# Patient Record
Sex: FEMALE | Race: AMERICAN INDIAN OR ALASKA NATIVE
[De-identification: names, ages, dates, MRNs, and addresses within clinical notes are randomized per-mention and may not be internally consistent; named-entity substitution may affect disease eponyms.]

---

## 2021-04-22 ENCOUNTER — HOSPITAL ENCOUNTER (EMERGENCY)
Dept: HOSPITAL 5 - ED | Age: 26
LOS: 1 days | Discharge: HOME | End: 2021-04-23
Payer: SELF-PAY

## 2021-04-22 VITALS — SYSTOLIC BLOOD PRESSURE: 118 MMHG | DIASTOLIC BLOOD PRESSURE: 73 MMHG

## 2021-04-22 DIAGNOSIS — O03.9: Primary | ICD-10-CM

## 2021-04-22 DIAGNOSIS — Z3A.01: ICD-10-CM

## 2021-04-22 DIAGNOSIS — Z98.890: ICD-10-CM

## 2021-04-22 DIAGNOSIS — Z79.899: ICD-10-CM

## 2021-04-22 LAB
ALBUMIN SERPL-MCNC: 4.2 G/DL (ref 3.9–5)
ALT SERPL-CCNC: 9 UNITS/L (ref 7–56)
APTT BLD: 24.1 SEC. (ref 24.2–36.6)
BAND NEUTROPHILS # (MANUAL): 0 K/MM3
BUN SERPL-MCNC: 6 MG/DL (ref 7–17)
BUN/CREAT SERPL: 12 %
CALCIUM SERPL-MCNC: 8.9 MG/DL (ref 8.4–10.2)
HCT VFR BLD CALC: 31.4 % (ref 30.3–42.9)
HEMOLYSIS INDEX: 2
HGB BLD-MCNC: 10.5 GM/DL (ref 10.1–14.3)
INR PPP: 1.04 (ref 0.87–1.13)
MCHC RBC AUTO-ENTMCNC: 33 % (ref 30–34)
MCV RBC AUTO: 81 FL (ref 79–97)
MYELOCYTES # (MANUAL): 0 K/MM3
PLATELET # BLD: 284 K/MM3 (ref 140–440)
PROMYELOCYTES # (MANUAL): 45.8 K/MM3
RBC # BLD AUTO: 3.86 M/MM3 (ref 3.65–5.03)
TOTAL CELLS COUNTED BLD: 100

## 2021-04-22 PROCEDURE — 83735 ASSAY OF MAGNESIUM: CPT

## 2021-04-22 PROCEDURE — 85610 PROTHROMBIN TIME: CPT

## 2021-04-22 PROCEDURE — 36415 COLL VENOUS BLD VENIPUNCTURE: CPT

## 2021-04-22 PROCEDURE — 96374 THER/PROPH/DIAG INJ IV PUSH: CPT

## 2021-04-22 PROCEDURE — 84702 CHORIONIC GONADOTROPIN TEST: CPT

## 2021-04-22 PROCEDURE — 99284 EMERGENCY DEPT VISIT MOD MDM: CPT

## 2021-04-22 PROCEDURE — 85007 BL SMEAR W/DIFF WBC COUNT: CPT

## 2021-04-22 PROCEDURE — 86901 BLOOD TYPING SEROLOGIC RH(D): CPT

## 2021-04-22 PROCEDURE — 80053 COMPREHEN METABOLIC PANEL: CPT

## 2021-04-22 PROCEDURE — 85730 THROMBOPLASTIN TIME PARTIAL: CPT

## 2021-04-22 PROCEDURE — 96375 TX/PRO/DX INJ NEW DRUG ADDON: CPT

## 2021-04-22 PROCEDURE — 76801 OB US < 14 WKS SINGLE FETUS: CPT

## 2021-04-22 PROCEDURE — 85025 COMPLETE CBC W/AUTO DIFF WBC: CPT

## 2021-04-22 PROCEDURE — 86900 BLOOD TYPING SEROLOGIC ABO: CPT

## 2021-04-22 PROCEDURE — 96361 HYDRATE IV INFUSION ADD-ON: CPT

## 2021-04-22 NOTE — ULTRASOUND REPORT
ULTRASOUND OBSTETRIC 



INDICATION / CLINICAL INFORMATION:

abdominal pain/vaginal bleeding; 12 wks preg.

Clinical Gestational Age (GA): Reported at 12 weeks



TECHNIQUE:

Transabdominal.



COMPARISON:

None available.



FINDINGS:

GESTATIONAL SAC: Well-defined oval shape and intrauterine in location. Measures 3.5 cm corresponding 
to estimated gestation of 8 weeks and 5 days.

YOLK SAC: Not visualized.



EMBRYO/FETUS: Not visualized. 



- Fetal Heart Rate, beats per minute (if present) = not present.



ADNEXA: No significant abnormality.

FREE FLUID: None.



ADDITIONAL FINDINGS: None.



IMPRESSION:

1. Findings suspicious for failed early pregnancy. Consider continued follow-up with serial beta hCG 
and short-term further evaluation with ultrasound in 7-14 days, as warranted.



Signer Name: Tayo Holguin MD 

Signed: 4/22/2021 9:45 PM

Workstation Name: [id number]

## 2021-04-22 NOTE — EMERGENCY DEPARTMENT REPORT
ED General Adult HPI





- General


Chief complaint: Vaginal Bleeding


Stated complaint: ABDOMINAL PAIN/VAG BLEEDING


Time Seen by Provider: 21 20:44


Source: patient, EMS


Mode of arrival: Stretcher


Limitations: No Limitations





- History of Present Illness


Initial comments: 


26-year-old pregnant female patient with history of multiple spontaneous 

abortions secondary to cervical incompetence presents to the emergency 

department with complaints of abdominal pain and vaginal bleeding starting 

today.  Patient has not received any prenatal care during this pregnancy.  She 

estimates she is approximately 12 weeks pregnant. First day of LMP was 

2021.  Patient has visualized multiple blood clots today.  Denies fever, 

chills, nausea, vomiting, syncope.  Denies all other complaints at this time.








- Related Data


                                  Previous Rx's











 Medication  Instructions  Recorded  Last Taken  Type


 


Ondansetron [Zofran Odt] 4 mg PO Q8HR #30 tab.rapdis 21 Unknown Rx


 


oxyCODONE /ACETAMINOPHEN [Percocet 1 tab PO Q6HR PRN #15 tablet 21 Unknown

 Rx





5/325]    











                                    Allergies











Allergy/AdvReac Type Severity Reaction Status Date / Time


 


No Known Allergies Allergy   Unverified 21 20:39














ED Review of Systems


ROS: 


Stated complaint: ABDOMINAL PAIN/VAG BLEEDING


Other details as noted in HPI





Other: 





GENERAL: Negative for fever, chills, weight change, anorexia, fatigue.


ENT: Negative for ear pain, difficulty hearing, sore throat, nasal congestion, 

epistaxis.


CARDIOVASCULAR: Negative for chest pain, palpitations, lower extremity swelling.




PULMONARY: Negative for cough, dyspnea, wheezing, orthopnea, cyanosis. 


GASTROINTESTINAL: Positive for abdominal pain


GENITOURINARY: Positive for vaginal bleeding


MUSCULOSKELETAL: Negative for joint pain, joint swelling, myalgias, back pain, 

neck pain. 


NEUROLOGICAL: Negative for headache, seizure, syncope, paresthesias, weakness. 


INTEGUMENTARY: Negative for erythema, rash, diaphoresis, laceration, ecchymosis.




HEMATOLOGICAL: Negative for hemoptysis, hematemesis, hematochezia, hematuria.


PSYCHIATRIC: Negative for hallucinations, suicidal ideation, homicidal ideation,

anxiety, depression.





ED Past Medical Hx





- Past Medical History


Previous Medical History?: No





- Surgical History


Past Surgical History?: Yes


Additional Surgical History:  X 3





- Social History


Smoking Status: Never Smoker


Substance Use Type: None





- Medications


Home Medications: 


                                Home Medications











 Medication  Instructions  Recorded  Confirmed  Last Taken  Type


 


Ondansetron [Zofran Odt] 4 mg PO Q8HR #30 tab.rapdis 21  Unknown Rx


 


oxyCODONE /ACETAMINOPHEN [Percocet 1 tab PO Q6HR PRN #15 tablet 21  

Unknown Rx





5/325]     














ED Physical Exam





- General


Limitations: No Limitations





- Other


Other exam information: 





General: Awake and alert.  Tearful, restless, uncomfortable.


Head: Atraumatic, normocephalic.


Eyes: EOMI. Pupils are equal and round. Normal sclera and conjunctiva. 


ENT: Oral mucosa is moist. Normal pharyngeal exam.


Neck: Supple. No lymphadenopathy.


Pulmonary: No respiratory distress. Clear to auscultation bilaterally. 


Cardiac: Regular rate and rhythm. Pulses are palpable and equal bilaterally. No 

lower extremity cyanosis or edema. 


Skin: Warm and dry. No rashes. 


Abdomen: Soft, non-protuberant.  Diffuse lower abdominal tenderness without 

guarding, rigidity, or rebound. Bowel sounds are normal. No organomegaly or mas

ses noted. 


Pelvic: Female chaperone (PAPO Molina) present. There is a moderate amount of 

blood in the vaginal vault.  Multiple blood clots removed. Cervical os is 

closed.  


Back: Normal alignment. No CVA tenderness.


Extremities: Symmetrical. Full range of motion intact. 


Neurological: Alert and oriented, appropriately interactive, no focal deficits.


Psych: Cooperative. Appropriate mood and affect. Speech is evenly metered. 

Thoughts are logically construed. 





ED Course


                                   Vital Signs











  21





  20:33 21:26 22:19


 


Temperature 97.7 F  


 


Pulse Rate 75  


 


Respiratory 18 18 18





Rate   


 


Blood Pressure 118/73  


 


O2 Sat by Pulse 99  





Oximetry   














  21





  22:20


 


Temperature 


 


Pulse Rate 


 


Respiratory 18





Rate 


 


Blood Pressure 


 


O2 Sat by Pulse 





Oximetry 














ED Medical Decision Making





- Lab Data


Result diagrams: 


                                 21 21:06





                                 21 21:06





- Radiology Data





[facility]  


                                     [address] [place] [address]  


 


                                         Ultrasound Report   


                                               Signed  


 


Patient: RONNI DUVALL                                              

                 MR  


#: [id number]          


: 1995                                                                

Acct:[id number]      


 


Age/Sex: 26 / F                                                                

ADM Date: 21     


 


Loc: ED       


Attending Dr:   


 


 


Ordering Physician: ADRY AGUDELO  


Date of Service: 21  


Procedure(s): US OB <= 14 weeks fetus  


Accession Number(s): [id number]  


 


cc: ADRY AGUDELO   


 


 


ULTRASOUND OBSTETRIC   


 


 INDICATION / CLINICAL INFORMATION:  


 abdominal pain/vaginal bleeding; 12 wks preg.  


 Clinical Gestational Age (GA): Reported at 12 weeks  


 


 TECHNIQUE:  


 Transabdominal.  


 


 COMPARISON:  


 None available.  


 


 FINDINGS:  


 GESTATIONAL SAC: Well-defined oval shape and intrauterine in location. Measures

3.5 cm 


corresponding to estimated gestation of 8 weeks and 5 days.  


 YOLK SAC: Not visualized.  


 


 EMBRYO/FETUS: Not visualized.   


 


 - Fetal Heart Rate, beats per minute (if present) = not present.  


 


 ADNEXA: No significant abnormality.  


 FREE FLUID: None.  


 


 ADDITIONAL FINDINGS: None.  


 


 IMPRESSION:  


 1. Findings suspicious for failed early pregnancy. Consider continued follow-up

with serial beta 


hCG and short-term further evaluation with ultrasound in 7-14 days, as 

warranted.  


 


 Signer Name: Doug Holguin MD   


 Signed: 2021 9:45 PM  


 Workstation Name: [id number]   


 


 


Transcribed By:   


Dictated By: DOUG HOLGUIN III  


Electronically Authenticated By: DOUG HOLGUIN III    


Signed Date/Time: 21                                


 


 


 


DD/DT: 21                                                            

 


TD/TT:





- Medical Decision Making


Differential diagnosis including but not limited to: ectopic pregnancy, septic 

, spontaneous , uterine rupture,  premature rupture of 

membranes, hemorrhagic shock





Patient presents to the emergency department with complaints of lower abdominal 

pain and vaginal bleeding.  She estimates she is approximately 12 weeks pregnant

by dates.  She has not undergone an ultrasound at any point during this 

pregnancy.  Patient has had multiple spontaneous abortions with prior 

pregnancies as a result of cervical incompetence.  Patient was brought back to 

the acute care area immediately following medical screening exam.  Cervix is 

closed however patient has significant abdominal pain with vaginal bleeding 

present. Analgesia discussed with Dr. Parekh, attending emergency physician, who

recommended Tylenol and IV fluids until sonographic confirmation of fetal demise

is obtained. Ultrasound shows well-defined intrauterine gestational sac 

measuring 3.5 cm, corresponding to an estimated gestational age of 8 weeks and 5

days.  Yolk sac and embryo/fetus not visualized.  Fetal heart rate absent.  No 

free fluid.  Findings suspicious for failed early pregnancy.  Beta hCG is ~6500.

 Hemoglobin is stable. Rh (+). Pain is controlled after IV Dilaudid, 

administered after confirmed fetal demise. 





History and exam findings consistent with spontaneous .  Case discussed 

with Dr. Almonte, OB/GYN, who recommended expectant management given the early 

gestational age.  She has agreed to follow-up with patient on an outpatient basi

s.  Patient's heart rate and blood pressure have remained stable.  There is no 

clinical indication for blood transfusion and/or emergent D&C at this time.  

Patient will be discharged home with appropriate analgesics and referred to 

OB/GYN for close outpatient follow-up.  Patient was advised that the bleeding 

will persist until the  is complete.  Patient expressed understanding 

and is agreeable to plan of care.  Strict return precautions provided.





Repeat exam is unremarkable and benign.  Pain is controlled, she is resting 

comfortably. History, exam, diagnostic testing, and current condition do not 

suggest worrisome pathology to warrant further testing, continued ED treatment, 

admission, or surgical evaluation at this point. Given the low probability of a 

significant medical illness, it would be more likely to result in harm than 

benefit to perform further testing at this stage. Discussed findings, 

presumptive diagnosis, need for follow-up and specific signs/symptoms that 

should prompt immediate return to the emergency department. Instructions were 

explained in detail to the patient in addition to giving written discharge 

information. Patient expressed understanding and was given the opportunity to 

ask questions, all of which were satisfactorily answered prior to discharge 

home.





Case discussed with Dr. Parekh, attending emergency physician, who agrees with 

diagnostic work-up/plan of care.


Critical care attestation.: 


If time is entered above; I have spent that time in minutes in the direct care 

of this critically ill patient, excluding procedure time.








ED Disposition


Clinical Impression: 


 Spontaneous 





Disposition: DC-01 TO HOME OR SELFCARE


Is pt being admited?: No


Does the pt Need Aspirin: No


Condition: Stable


Instructions:  Miscarriage, Easy-to-Read


Additional Instructions: 


Take Percocet with food as directed for pain.  Do not consume alcohol taking 

this medication.  Do not drive or operate heavy machinery while taking this 

medication.


Take Zofran for nausea/vomiting as directed.


Rest.  Drink plenty of fluids.


Follow-up with Dr. Almonte, obstetrics, this week.  Call tomorrow to schedule an

appointment.


Return to the emergency department immediately for new or worsening symptoms.


Specifically, return to the emergency department immediately for fever, 

worsening abdominal pain, loss of consciousness, dizziness, chest pain, mckenna

rtness of breath, palpitations, skin color changes, or any other concerns. 


Prescriptions: 


oxyCODONE /ACETAMINOPHEN [Percocet 5/325] 1 tab PO Q6HR PRN #15 tablet


 PRN Reason: Pain


Ondansetron [Zofran Odt] 4 mg PO Q8HR #30 tab.rapdis


Referrals: 


MISAEL ALMONTE MD [Staff Physician] - 3-5 Days


Forms:  Work/School Release Form(ED)


Time of Disposition: 00:02